# Patient Record
Sex: MALE | Race: OTHER | Employment: FULL TIME | URBAN - METROPOLITAN AREA
[De-identification: names, ages, dates, MRNs, and addresses within clinical notes are randomized per-mention and may not be internally consistent; named-entity substitution may affect disease eponyms.]

---

## 2021-08-21 ENCOUNTER — APPOINTMENT (OUTPATIENT)
Dept: CT IMAGING | Age: 21
End: 2021-08-21
Payer: COMMERCIAL

## 2021-08-21 ENCOUNTER — APPOINTMENT (OUTPATIENT)
Dept: GENERAL RADIOLOGY | Age: 21
End: 2021-08-21
Payer: COMMERCIAL

## 2021-08-21 ENCOUNTER — HOSPITAL ENCOUNTER (EMERGENCY)
Age: 21
Discharge: HOME | End: 2021-08-21
Payer: COMMERCIAL

## 2021-08-21 VITALS
OXYGEN SATURATION: 97 % | TEMPERATURE: 97.1 F | HEART RATE: 87 BPM | WEIGHT: 230 LBS | SYSTOLIC BLOOD PRESSURE: 137 MMHG | DIASTOLIC BLOOD PRESSURE: 77 MMHG | HEIGHT: 70 IN | RESPIRATION RATE: 18 BRPM | BODY MASS INDEX: 32.93 KG/M2

## 2021-08-21 DIAGNOSIS — R29.898 WRIST WEAKNESS: Primary | ICD-10-CM

## 2021-08-21 PROCEDURE — 73110 X-RAY EXAM OF WRIST: CPT

## 2021-08-21 PROCEDURE — 70450 CT HEAD/BRAIN W/O DYE: CPT

## 2021-08-21 PROCEDURE — 99283 EMERGENCY DEPT VISIT LOW MDM: CPT

## 2021-08-21 PROCEDURE — 72125 CT NECK SPINE W/O DYE: CPT

## 2021-08-21 ASSESSMENT — ENCOUNTER SYMPTOMS
VOMITING: 0
ABDOMINAL PAIN: 0
NAUSEA: 0
SHORTNESS OF BREATH: 0

## 2021-08-21 NOTE — ED PROVIDER NOTES
[address] [facility] ED  eMERGENCY dEPARTMENT eNCOUnter      Pt Name: Lexie Marrero  MRN: [id number]  [place] 2000  Date of evaluation: 8/21/2021  Provider: Ginny Rasmussen PA-C        HISTORY OF PRESENT ILLNESS    Lexie Marrero is a 23 y.o. male otherwise healthy; presenting to the ED for acute left wrist weakness that began last night. Patient states that his left wrist became weak. He states he has a bit of decreased sensation on the left thumb and small dorsal aspect of left wrist. Denies any cp, sob, HA, head injury, visual disturbances, weakness elsewhere, n/v/d, neck injury, neck pain, fever, or any other physical complaints. REVIEW OF SYSTEMS       Review of Systems   Constitutional: Negative for fatigue and fever. HENT: Negative. Respiratory: Negative for shortness of breath. Cardiovascular: Negative for chest pain. Gastrointestinal: Negative for abdominal pain, nausea and vomiting. Genitourinary: Negative. Neurological: Positive for weakness and numbness. All other systems reviewed and are negative. Except as noted above the remainder of the review of systems was reviewed and negative. PAST MEDICAL HISTORY   History reviewed. No pertinent past medical history. SURGICAL HISTORY     History reviewed. No pertinent surgical history. CURRENT MEDICATIONS       Previous Medications    No medications on file       ALLERGIES     Patient has no known allergies. FAMILY HISTORY     History reviewed. No pertinent family history.        SOCIAL HISTORY       Social History     Socioeconomic History    Marital status: Single     Spouse name: None    Number of children: None    Years of education: None    Highest education level: None   Occupational History    None   Tobacco Use    Smoking status: Current Every Day Smoker     Types: E-Cigarettes    Smokeless tobacco: Never Used   Substance and Sexual Activity    Alcohol use: None    Drug use: None    Sexual activity: None   Other Topics Concern    None   Social History Narrative    None     Social Determinants of Health     Financial Resource Strain:     Difficulty of Paying Living Expenses:    Food Insecurity:     Worried About Running Out of Food in the Last Year:     [address] in the Last Year:    Transportation Needs:     Lack of Transportation (Medical):  Lack of Transportation (Non-Medical):    Physical Activity:     Days of Exercise per Week:     Minutes of Exercise per Session:    Stress:     Feeling of Stress :    Social Connections:     Frequency of Communication with Friends and Family:     Frequency of Social Gatherings with Friends and Family:     Attends Rastafarian Services:     Active Member of Clubs or Organizations:     Attends Club or Organization Meetings:     Marital Status:    Intimate Partner Violence:     Fear of Current or Ex-Partner:     Emotionally Abused:     Physically Abused:     Sexually Abused:          PHYSICAL EXAM        ED Triage Vitals [08/21/21 1117]   BP Temp Temp Source Pulse Resp SpO2 Height Weight   137/77 97.1 °F (36.2 °C) Temporal 87 18 97 % 5' 10\" (1.778 m) 230 lb (104.3 kg)       Physical Exam  Vitals and nursing note reviewed. Constitutional:       General: He is not in acute distress. Appearance: Normal appearance. He is well-developed. He is not ill-appearing, toxic-appearing or diaphoretic. HENT:      Head: Normocephalic and atraumatic. Right Ear: Tympanic membrane, ear canal and external ear normal.      Left Ear: Tympanic membrane, ear canal and external ear normal.      Nose: Nose normal.      Mouth/Throat:      Mouth: Mucous membranes are moist.      Pharynx: No oropharyngeal exudate. Eyes:      General: No visual field deficit or scleral icterus. Right eye: No discharge. Left eye: No discharge. Conjunctiva/sclera: Conjunctivae normal.      Pupils: Pupils are equal, round, and reactive to light. Cardiovascular:      Rate and Rhythm: Normal rate and regular rhythm. Pulses: Normal pulses. Heart sounds: Normal heart sounds. No murmur heard. Pulmonary:      Effort: Pulmonary effort is normal. No respiratory distress. Breath sounds: Normal breath sounds. No wheezing or rales. Chest:      Chest wall: No tenderness. Abdominal:      General: Bowel sounds are normal. There is no distension. Palpations: Abdomen is soft. There is no mass. Tenderness: There is no abdominal tenderness. There is no guarding or rebound. Musculoskeletal:         General: No swelling, tenderness, deformity or signs of injury. Right forearm: Normal. No swelling or edema. Left forearm: Normal. No swelling or edema. Right wrist: Normal. No swelling or tenderness. Normal range of motion. Normal pulse. Left wrist: No swelling or tenderness. Decreased range of motion. Normal pulse. Right hand: Normal. No swelling. Normal strength. Left hand: No swelling. Decreased strength of finger abduction and wrist extension. Normal sensation. Normal capillary refill. Normal pulse. Cervical back: Normal range of motion and neck supple. Comments: Unable to dorsiflex   Skin:     General: Skin is warm. Capillary Refill: Capillary refill takes less than 2 seconds. Coloration: Skin is not pale. Findings: No erythema or rash. Neurological:      General: No focal deficit present. Mental Status: He is alert and oriented to person, place, and time. Cranial Nerves: No cranial nerve deficit or facial asymmetry. Sensory: Sensation is intact. No sensory deficit. Motor: Weakness present. Coordination: Coordination is intact. Coordination normal.      Gait: Gait is intact. Gait normal.      Deep Tendon Reflexes: Reflexes are normal and symmetric.  Reflexes normal.   Psychiatric:         Mood and Affect: Mood normal.         Behavior: Behavior normal. Thought Content: Thought content normal.         Judgment: Judgment normal.           LABS:  Labs Reviewed - No data to display      MDM:   Vitals:    Vitals:    08/21/21 1117   BP: 137/77   Pulse: 87   Resp: 18   Temp: 97.1 °F (36.2 °C)   TempSrc: Temporal   SpO2: 97%   Weight: 230 lb (104.3 kg)   Height: 5' 10\" (1.778 m)           PROCEDURES:  Unlessotherwise noted below, none      Procedures      FINAL IMPRESSION      1. Wrist weakness          DISPOSITION/PLAN   DISPOSITION    Nursing notes, medical records and triage notes reviewed. Vital signs reviewed. This is a 23year old male otherwise healthy; presenting to the ED for acute left wrist weakness that began last night. Case discussed with attending. Likely tendonitis. Follow up with PCP in 2 days.      The patient and/or family  -had the results of all tests and diagnosis explained to them  -Given both verbal and written discharge instructions  -Were instructed of the importance of close follow-up  -Were told that close follow-up is essential for good health and good outcomes    Bucky Suazo PA-C (electronically signed)  Emergency Physician Assistant          Bucky Suazo PA-C  08/21/21 1279

## 2025-03-10 ENCOUNTER — TELEPHONE (OUTPATIENT)
Dept: CARDIOLOGY | Facility: CLINIC | Age: 25
End: 2025-03-10

## 2025-03-10 ENCOUNTER — OFFICE VISIT (OUTPATIENT)
Dept: PRIMARY CARE | Facility: CLINIC | Age: 25
End: 2025-03-10
Payer: COMMERCIAL

## 2025-03-10 VITALS
TEMPERATURE: 98.1 F | DIASTOLIC BLOOD PRESSURE: 77 MMHG | HEART RATE: 78 BPM | WEIGHT: 268 LBS | OXYGEN SATURATION: 96 % | BODY MASS INDEX: 38.45 KG/M2 | SYSTOLIC BLOOD PRESSURE: 148 MMHG

## 2025-03-10 DIAGNOSIS — Z13.220 LIPID SCREENING: ICD-10-CM

## 2025-03-10 DIAGNOSIS — Z00.00 HEALTHCARE MAINTENANCE: ICD-10-CM

## 2025-03-10 DIAGNOSIS — I86.1 RIGHT VARICOCELE: ICD-10-CM

## 2025-03-10 PROCEDURE — 99213 OFFICE O/P EST LOW 20 MIN: CPT | Performed by: FAMILY MEDICINE

## 2025-03-10 PROCEDURE — 1036F TOBACCO NON-USER: CPT | Performed by: FAMILY MEDICINE

## 2025-03-10 ASSESSMENT — PATIENT HEALTH QUESTIONNAIRE - PHQ9
SUM OF ALL RESPONSES TO PHQ9 QUESTIONS 1 AND 2: 0
2. FEELING DOWN, DEPRESSED OR HOPELESS: NOT AT ALL
1. LITTLE INTEREST OR PLEASURE IN DOING THINGS: NOT AT ALL

## 2025-03-12 ENCOUNTER — HOSPITAL ENCOUNTER (OUTPATIENT)
Dept: RADIOLOGY | Facility: HOSPITAL | Age: 25
Discharge: HOME | End: 2025-03-12
Payer: COMMERCIAL

## 2025-03-12 DIAGNOSIS — I86.1 RIGHT VARICOCELE: ICD-10-CM

## 2025-03-12 PROCEDURE — 76870 US EXAM SCROTUM: CPT

## 2025-03-12 ASSESSMENT — ENCOUNTER SYMPTOMS
HEMATOLOGIC/LYMPHATIC NEGATIVE: 1
ENDOCRINE NEGATIVE: 1
PSYCHIATRIC NEGATIVE: 1
CARDIOVASCULAR NEGATIVE: 1
EYES NEGATIVE: 1
CONSTITUTIONAL NEGATIVE: 1
ALLERGIC/IMMUNOLOGIC NEGATIVE: 1
GASTROINTESTINAL NEGATIVE: 1
MUSCULOSKELETAL NEGATIVE: 1
RESPIRATORY NEGATIVE: 1

## 2025-03-12 NOTE — PROGRESS NOTES
Ronaldo Zayas is here with symptoms of a lump on his right testicle that he noticed about 2 to 3 days ago.  It has been very slightly painful.  He denies any trauma.  He has no UTI symptoms such as dysuria burning or frequency.  He is currently not on any medications.  He has not been seen in the office for several years.  He does have a history of elevated blood pressure.  He states that he has a family history of heart disease in his father and grandfather.  He denies smoking but he does vape..  He drinks alcohol intermittently.    Patient ID: Marquez Campos is a 24 y.o. male who presents for Mass (On testicles 1 day):    Problem List Items Addressed This Visit    None  Visit Diagnoses       Right varicocele        Relevant Orders    US scrotum    CBC    Comprehensive Metabolic Panel    Healthcare maintenance        Relevant Orders    CBC    Comprehensive Metabolic Panel    Lipid Panel    Lipid screening        Relevant Orders    CBC    Comprehensive Metabolic Panel    Lipid Panel           History reviewed. No pertinent past medical history.   History reviewed. No pertinent surgical history.   No family history on file.   Social History     Socioeconomic History    Marital status: Single     Spouse name: Not on file    Number of children: Not on file    Years of education: Not on file    Highest education level: Not on file   Occupational History    Not on file   Tobacco Use    Smoking status: Never    Smokeless tobacco: Never   Vaping Use    Vaping status: Every Day   Substance and Sexual Activity    Alcohol use: Yes    Drug use: Yes     Types: Marijuana    Sexual activity: Not on file   Other Topics Concern    Not on file   Social History Narrative    Not on file     Social Drivers of Health     Financial Resource Strain: Not on file   Food Insecurity: Not on file   Transportation Needs: Not on file   Physical Activity: Not on file   Stress: Not on file   Social Connections: Not on file   Intimate Partner  Violence: Not on file   Housing Stability: Not on file      Patient has no known allergies.   No current outpatient medications on file.     No current facility-administered medications for this visit.         There is no immunization history on file for this patient.     Review of Systems   Constitutional: Negative.    HENT: Negative.     Eyes: Negative.    Respiratory: Negative.     Cardiovascular: Negative.    Gastrointestinal: Negative.    Endocrine: Negative.    Genitourinary:  Positive for testicular pain.   Musculoskeletal: Negative.    Skin: Negative.    Allergic/Immunologic: Negative.    Hematological: Negative.    Psychiatric/Behavioral: Negative.     All other systems reviewed and are negative.       Vitals:    03/10/25 1603   BP: 148/77   Pulse: 78   Temp: 36.7 °C (98.1 °F)   SpO2: 96%     Vitals:    03/10/25 1603   Weight: 122 kg (268 lb)      Physical Exam  Constitutional:       General: He is not in acute distress.     Appearance: Normal appearance.   Cardiovascular:      Rate and Rhythm: Normal rate and regular rhythm.      Pulses: Normal pulses.      Heart sounds: Normal heart sounds. No murmur heard.     No friction rub. No gallop.   Pulmonary:      Effort: Pulmonary effort is normal. No respiratory distress.      Breath sounds: Normal breath sounds. No wheezing or rales.   Neurological:      Mental Status: He is alert.     Testes-both testicles are descended.  The right testicle is normal without masses or tenderness.  There is a very small nodular area above the right testicle.  This is only very slightly tender.  The left testicle is also normal without masses or tenderness.  There is no scrotal swelling.    ASSESSMENT/PLAN: Probable varicocele of right testicular area.  Schedule for bilateral testicular ultrasound.  Call for any worsening pain.    Elevated blood pressure.  Check CBC CMP lipid profile.  Monitor salt in diet.  Recommended that he discontinue vaping.  Exercise  regularly.  Follow-up in 3 to 4 weeks and call as needed

## 2025-03-13 DIAGNOSIS — I86.1 RIGHT VARICOCELE: Primary | ICD-10-CM

## 2025-03-13 DIAGNOSIS — N50.89 SCROTAL FULLNESS: ICD-10-CM

## 2025-03-13 LAB
ALBUMIN SERPL-MCNC: 4.7 G/DL (ref 3.6–5.1)
ALP SERPL-CCNC: 90 U/L (ref 36–130)
ALT SERPL-CCNC: 44 U/L (ref 9–46)
ANION GAP SERPL CALCULATED.4IONS-SCNC: 10 MMOL/L (CALC) (ref 7–17)
AST SERPL-CCNC: 24 U/L (ref 10–40)
BILIRUB SERPL-MCNC: 1.1 MG/DL (ref 0.2–1.2)
BUN SERPL-MCNC: 15 MG/DL (ref 7–25)
CALCIUM SERPL-MCNC: 9.9 MG/DL (ref 8.6–10.3)
CHLORIDE SERPL-SCNC: 104 MMOL/L (ref 98–110)
CHOLEST SERPL-MCNC: 153 MG/DL
CHOLEST/HDLC SERPL: 3.3 (CALC)
CO2 SERPL-SCNC: 26 MMOL/L (ref 20–32)
CREAT SERPL-MCNC: 1.02 MG/DL (ref 0.6–1.24)
EGFRCR SERPLBLD CKD-EPI 2021: 105 ML/MIN/1.73M2
ERYTHROCYTE [DISTWIDTH] IN BLOOD BY AUTOMATED COUNT: 12.5 % (ref 11–15)
GLUCOSE SERPL-MCNC: 80 MG/DL (ref 65–99)
HCT VFR BLD AUTO: 44.9 % (ref 38.5–50)
HDLC SERPL-MCNC: 46 MG/DL
HGB BLD-MCNC: 15.7 G/DL (ref 13.2–17.1)
LDLC SERPL CALC-MCNC: 92 MG/DL (CALC)
MCH RBC QN AUTO: 31.2 PG (ref 27–33)
MCHC RBC AUTO-ENTMCNC: 35 G/DL (ref 32–36)
MCV RBC AUTO: 89.3 FL (ref 80–100)
NONHDLC SERPL-MCNC: 107 MG/DL (CALC)
PLATELET # BLD AUTO: 269 THOUSAND/UL (ref 140–400)
PMV BLD REES-ECKER: 10.6 FL (ref 7.5–12.5)
POTASSIUM SERPL-SCNC: 4.1 MMOL/L (ref 3.5–5.3)
PROT SERPL-MCNC: 7 G/DL (ref 6.1–8.1)
RBC # BLD AUTO: 5.03 MILLION/UL (ref 4.2–5.8)
SODIUM SERPL-SCNC: 140 MMOL/L (ref 135–146)
TRIGL SERPL-MCNC: 61 MG/DL
WBC # BLD AUTO: 7.2 THOUSAND/UL (ref 3.8–10.8)

## 2025-03-24 ENCOUNTER — APPOINTMENT (OUTPATIENT)
Dept: PRIMARY CARE | Facility: CLINIC | Age: 25
End: 2025-03-24

## 2025-03-24 VITALS
BODY MASS INDEX: 38.54 KG/M2 | DIASTOLIC BLOOD PRESSURE: 87 MMHG | HEART RATE: 74 BPM | HEIGHT: 70 IN | TEMPERATURE: 97.9 F | SYSTOLIC BLOOD PRESSURE: 119 MMHG | WEIGHT: 269.2 LBS

## 2025-03-24 DIAGNOSIS — Z00.00 ANNUAL WELLNESS VISIT: Primary | ICD-10-CM

## 2025-03-24 DIAGNOSIS — R03.0 ELEVATED BLOOD PRESSURE READING: ICD-10-CM

## 2025-03-24 PROCEDURE — 3008F BODY MASS INDEX DOCD: CPT | Performed by: FAMILY MEDICINE

## 2025-03-24 PROCEDURE — 1036F TOBACCO NON-USER: CPT | Performed by: FAMILY MEDICINE

## 2025-03-24 PROCEDURE — 99395 PREV VISIT EST AGE 18-39: CPT | Performed by: FAMILY MEDICINE

## 2025-03-24 ASSESSMENT — ENCOUNTER SYMPTOMS
ENDOCRINE NEGATIVE: 1
MUSCULOSKELETAL NEGATIVE: 1
EYES NEGATIVE: 1
CARDIOVASCULAR NEGATIVE: 1
COUGH: 1
HEMATOLOGIC/LYMPHATIC NEGATIVE: 1
PSYCHIATRIC NEGATIVE: 1
GASTROINTESTINAL NEGATIVE: 1
CONSTITUTIONAL NEGATIVE: 1
ALLERGIC/IMMUNOLOGIC NEGATIVE: 1

## 2025-03-24 ASSESSMENT — PATIENT HEALTH QUESTIONNAIRE - PHQ9
2. FEELING DOWN, DEPRESSED OR HOPELESS: NOT AT ALL
SUM OF ALL RESPONSES TO PHQ9 QUESTIONS 1 AND 2: 0
1. LITTLE INTEREST OR PLEASURE IN DOING THINGS: NOT AT ALL

## 2025-03-24 NOTE — PROGRESS NOTES
Ronaldo Zayas is here for a wellness visit.  He states that he is overall been feeling well.  He has slight nasal congestion and cough for the last 3 to 4 days.  No fevers or chills.  He has had no testicular pain.  His testicular ultrasound was negative but inconclusive on the right epididymis.  He is scheduled for CT of abdomen and pelvis.  He otherwise has no complaints.  He denies smoking.  He does drink alcohol on occasion.    Patient ID: Marquez Campso is a 24 y.o. male who presents for Annual Exam (Physical):    Problem List Items Addressed This Visit    None     No past medical history on file.   History reviewed. No pertinent surgical history.   Family History   Problem Relation Name Age of Onset    Heart disease Father        Social History     Socioeconomic History    Marital status: Single     Spouse name: Not on file    Number of children: Not on file    Years of education: Not on file    Highest education level: Not on file   Occupational History    Not on file   Tobacco Use    Smoking status: Never    Smokeless tobacco: Never   Vaping Use    Vaping status: Every Day   Substance and Sexual Activity    Alcohol use: Yes     Comment: weekly    Drug use: Yes     Types: Marijuana    Sexual activity: Not on file   Other Topics Concern    Not on file   Social History Narrative    Not on file     Social Drivers of Health     Financial Resource Strain: Not on file   Food Insecurity: Not on file   Transportation Needs: Not on file   Physical Activity: Not on file   Stress: Not on file   Social Connections: Not on file   Intimate Partner Violence: Not on file   Housing Stability: Not on file      Patient has no known allergies.   No current outpatient medications on file.     No current facility-administered medications for this visit.       Immunization History   Administered Date(s) Administered    DTaP, Unspecified 1999, 02/03/2000, 03/24/2000, 01/04/2001, 04/23/2004    Flu vaccine (IIV4), preservative  free *Check age/dose* 09/27/2019, 09/30/2020, 11/02/2020    Flu vaccine, trivalent, preservative free, age 6 months and greater (Fluarix/Fluzone/Flulaval) 10/10/2015    Hepatitis B vaccine, 19 yrs and under (RECOMBIVAX, ENGERIX) 1999, 02/03/2000, 07/06/2000    HiB PRP-OMP conjugate vaccine, pediatric (PEDVAXHIB) 1999, 02/03/2000, 10/05/2000    HiB, unspecified 03/24/2000    Influenza, seasonal, injectable 10/04/2016, 10/04/2017    MMR vaccine, subcutaneous (MMR II) 10/05/2000, 04/23/2004    Pneumococcal Conjugate PCV 7 01/04/2001, 04/05/2001    Poliovirus vaccine, subcutaneous (IPOL) 1999, 02/03/2000, 10/05/2000, 04/23/2004    Varicella vaccine, subcutaneous (VARIVAX) 04/05/2001        Review of Systems   Constitutional: Negative.    HENT:  Positive for congestion.    Eyes: Negative.    Respiratory:  Positive for cough.    Cardiovascular: Negative.    Gastrointestinal: Negative.    Endocrine: Negative.    Genitourinary: Negative.    Musculoskeletal: Negative.    Skin: Negative.    Allergic/Immunologic: Negative.    Hematological: Negative.    Psychiatric/Behavioral: Negative.     All other systems reviewed and are negative.       Vitals:    03/24/25 1626   BP: 119/87   Pulse: 74   Temp: 36.6 °C (97.9 °F)     Vitals:    03/24/25 1626   Weight: 122 kg (269 lb 3.2 oz)      Physical Exam  Constitutional:       General: He is not in acute distress.     Appearance: Normal appearance.   HENT:      Head: Normocephalic and atraumatic.      Right Ear: There is impacted cerumen.      Left Ear: There is impacted cerumen.   Eyes:      Conjunctiva/sclera: Conjunctivae normal.      Pupils: Pupils are equal, round, and reactive to light.   Cardiovascular:      Rate and Rhythm: Normal rate and regular rhythm.      Pulses: Normal pulses.      Heart sounds: Normal heart sounds. No murmur heard.     No friction rub. No gallop.   Pulmonary:      Effort: Pulmonary effort is normal. No respiratory distress.      Breath  sounds: Normal breath sounds. No wheezing or rales.   Neurological:      General: No focal deficit present.      Mental Status: He is alert. Mental status is at baseline.   Psychiatric:         Mood and Affect: Mood normal.         Behavior: Behavior normal.         Thought Content: Thought content normal.         Judgment: Judgment normal.        Annual wellness visit.  Labs as noted are up-to-date  ASSESSMENT/PLAN: Elevated blood pressure with mild improvement.  We again discussed the importance of monitoring his salt.  Avoid smoking or vaping.  Also limit alcohol and caffeine intake.  Exercise regularly.    Mild URI.  Monitor for now and call if any worsening.    Right varicocele.  Proceed with CT as scheduled for further evaluation of right epididymis    Follow-up in 3 months and call as needed

## 2025-03-31 ENCOUNTER — ANCILLARY PROCEDURE (OUTPATIENT)
Facility: HOSPITAL | Age: 25
End: 2025-03-31
Payer: COMMERCIAL

## 2025-03-31 DIAGNOSIS — I86.1 RIGHT VARICOCELE: ICD-10-CM

## 2025-03-31 DIAGNOSIS — N50.89 SCROTAL FULLNESS: ICD-10-CM

## 2025-03-31 PROCEDURE — 2550000001 HC RX 255 CONTRASTS: Performed by: FAMILY MEDICINE

## 2025-03-31 PROCEDURE — 74177 CT ABD & PELVIS W/CONTRAST: CPT

## 2025-03-31 PROCEDURE — 74177 CT ABD & PELVIS W/CONTRAST: CPT | Performed by: RADIOLOGY

## 2025-03-31 RX ADMIN — IOHEXOL 75 ML: 350 INJECTION, SOLUTION INTRAVENOUS at 15:34

## 2025-04-02 ENCOUNTER — APPOINTMENT (OUTPATIENT)
Facility: HOSPITAL | Age: 25
End: 2025-04-02
Payer: COMMERCIAL

## 2025-04-03 ENCOUNTER — TELEPHONE (OUTPATIENT)
Dept: PRIMARY CARE | Facility: CLINIC | Age: 25
End: 2025-04-03
Payer: COMMERCIAL

## 2025-04-03 NOTE — TELEPHONE ENCOUNTER
----- Message from Anders Obando sent at 4/2/2025  3:49 PM EDT -----  Tell pt CT normal exc small bilateral inguinal hernias.  Will discuss at next OV

## 2025-05-01 ENCOUNTER — OFFICE VISIT (OUTPATIENT)
Dept: PRIMARY CARE | Facility: CLINIC | Age: 25
End: 2025-05-01
Payer: COMMERCIAL

## 2025-05-01 ENCOUNTER — APPOINTMENT (OUTPATIENT)
Dept: PRIMARY CARE | Facility: CLINIC | Age: 25
End: 2025-05-01
Payer: COMMERCIAL

## 2025-05-01 VITALS
HEIGHT: 70 IN | SYSTOLIC BLOOD PRESSURE: 136 MMHG | TEMPERATURE: 98.8 F | DIASTOLIC BLOOD PRESSURE: 90 MMHG | HEART RATE: 79 BPM | WEIGHT: 250.6 LBS | BODY MASS INDEX: 35.88 KG/M2

## 2025-05-01 DIAGNOSIS — F32.A DEPRESSION, UNSPECIFIED DEPRESSION TYPE: ICD-10-CM

## 2025-05-01 PROCEDURE — 99213 OFFICE O/P EST LOW 20 MIN: CPT | Performed by: FAMILY MEDICINE

## 2025-05-01 PROCEDURE — 1036F TOBACCO NON-USER: CPT | Performed by: FAMILY MEDICINE

## 2025-05-01 PROCEDURE — 3008F BODY MASS INDEX DOCD: CPT | Performed by: FAMILY MEDICINE

## 2025-05-01 RX ORDER — SERTRALINE HYDROCHLORIDE 50 MG/1
TABLET, FILM COATED ORAL
Qty: 90 TABLET | Refills: 1 | Status: SHIPPED | OUTPATIENT
Start: 2025-05-01

## 2025-05-01 ASSESSMENT — PATIENT HEALTH QUESTIONNAIRE - PHQ9
6. FEELING BAD ABOUT YOURSELF - OR THAT YOU ARE A FAILURE OR HAVE LET YOURSELF OR YOUR FAMILY DOWN: NEARLY EVERY DAY
10. IF YOU CHECKED OFF ANY PROBLEMS, HOW DIFFICULT HAVE THESE PROBLEMS MADE IT FOR YOU TO DO YOUR WORK, TAKE CARE OF THINGS AT HOME, OR GET ALONG WITH OTHER PEOPLE: NOT DIFFICULT AT ALL
7. TROUBLE CONCENTRATING ON THINGS, SUCH AS READING THE NEWSPAPER OR WATCHING TELEVISION: NEARLY EVERY DAY
4. FEELING TIRED OR HAVING LITTLE ENERGY: NOT AT ALL
SUM OF ALL RESPONSES TO PHQ9 QUESTIONS 1 AND 2: 5
9. THOUGHTS THAT YOU WOULD BE BETTER OFF DEAD, OR OF HURTING YOURSELF: SEVERAL DAYS
1. LITTLE INTEREST OR PLEASURE IN DOING THINGS: MORE THAN HALF THE DAYS
3. TROUBLE FALLING OR STAYING ASLEEP OR SLEEPING TOO MUCH: SEVERAL DAYS
2. FEELING DOWN, DEPRESSED OR HOPELESS: NEARLY EVERY DAY
SUM OF ALL RESPONSES TO PHQ QUESTIONS 1-9: 19
5. POOR APPETITE OR OVEREATING: NEARLY EVERY DAY
8. MOVING OR SPEAKING SO SLOWLY THAT OTHER PEOPLE COULD HAVE NOTICED. OR THE OPPOSITE, BEING SO FIGETY OR RESTLESS THAT YOU HAVE BEEN MOVING AROUND A LOT MORE THAN USUAL: NEARLY EVERY DAY

## 2025-05-03 ASSESSMENT — ENCOUNTER SYMPTOMS
CARDIOVASCULAR NEGATIVE: 1
GASTROINTESTINAL NEGATIVE: 1
AGITATION: 1
HEMATOLOGIC/LYMPHATIC NEGATIVE: 1
RESPIRATORY NEGATIVE: 1
ENDOCRINE NEGATIVE: 1
ALLERGIC/IMMUNOLOGIC NEGATIVE: 1
NERVOUS/ANXIOUS: 1
CONSTITUTIONAL NEGATIVE: 1
MUSCULOSKELETAL NEGATIVE: 1
EYES NEGATIVE: 1
DECREASED CONCENTRATION: 1

## 2025-05-03 NOTE — PROGRESS NOTES
Ronaldo Zayas is here with concerns of feeling angry and irritable with poor concentration and crying episodes.  This morning he went to work and became angry and irritable for no apparent reason.  The patient states that he does not know anything that happened at work or anything that was said that may have irritated him.  He is concerned about these recurrent episodes.  He apparently began crying this morning at work as well.  He denies any current stress in his life.  He is currently not in any relationship.  He finds himself watching various shows and becomes tearful.  He also notes poor concentration.  He feels somewhat depressed but denies any severe sadness.  He is sleeping well.  His appetite is somewhat diminished.  The patient definitely denies any thoughts of violence or hurting other people.  He also denies any suicidal ideation or planning.  There is a family history of suicide in his aunt and father also has been treated for depression.  He does not have a history of ADD in the past and overall did well during his schooling.  He drinks occasional beer but not daily.  He does occasionally smoke marijuana and vapes as well.    Patient ID: Marquez Campos is a 24 y.o. male who presents for Depression (Unable to control emotions with no reason as to why, loss of appetite):    Problem List Items Addressed This Visit    None  Visit Diagnoses         Depression, unspecified depression type        Relevant Medications    sertraline (Zoloft) 50 mg tablet    Other Relevant Orders    Referral to Psychology           Medical History[1]   Surgical History[2]   Family History[3]   Social History     Socioeconomic History    Marital status: Single     Spouse name: Not on file    Number of children: Not on file    Years of education: Not on file    Highest education level: Not on file   Occupational History    Not on file   Tobacco Use    Smoking status: Never    Smokeless tobacco: Never   Vaping Use    Vaping status:  Every Day   Substance and Sexual Activity    Alcohol use: Yes     Comment: monthly    Drug use: Yes     Types: Marijuana    Sexual activity: Not on file   Other Topics Concern    Not on file   Social History Narrative    Not on file     Social Drivers of Health     Financial Resource Strain: Not on file   Food Insecurity: Not on file   Transportation Needs: Not on file   Physical Activity: Not on file   Stress: Not on file   Social Connections: Not on file   Intimate Partner Violence: Not on file   Housing Stability: Not on file      Patient has no known allergies.   Current Medications[4]    Immunization History   Administered Date(s) Administered    DTaP, Unspecified 1999, 02/03/2000, 03/24/2000, 01/04/2001, 04/23/2004    Flu vaccine (IIV4), preservative free *Check age/dose* 09/27/2019, 09/30/2020, 11/02/2020    Flu vaccine, trivalent, preservative free, age 6 months and greater (Fluarix/Fluzone/Flulaval) 10/10/2015    Hepatitis B vaccine, 19 yrs and under (RECOMBIVAX, ENGERIX) 1999, 02/03/2000, 07/06/2000    HiB PRP-OMP conjugate vaccine, pediatric (PEDVAXHIB) 1999, 02/03/2000, 10/05/2000    HiB, unspecified 03/24/2000    Influenza, seasonal, injectable 10/04/2016, 10/04/2017    MMR vaccine, subcutaneous (MMR II) 10/05/2000, 04/23/2004    Pneumococcal Conjugate PCV 7 01/04/2001, 04/05/2001    Poliovirus vaccine, subcutaneous (IPOL) 1999, 02/03/2000, 10/05/2000, 04/23/2004    Varicella vaccine, subcutaneous (VARIVAX) 04/05/2001        Review of Systems   Constitutional: Negative.    HENT: Negative.     Eyes: Negative.    Respiratory: Negative.     Cardiovascular: Negative.    Gastrointestinal: Negative.    Endocrine: Negative.    Genitourinary: Negative.    Musculoskeletal: Negative.    Skin: Negative.    Allergic/Immunologic: Negative.    Hematological: Negative.    Psychiatric/Behavioral:  Positive for agitation and decreased concentration. The patient is nervous/anxious.    All other  systems reviewed and are negative.       Vitals:    05/01/25 1548   BP: 136/90   Pulse: 79   Temp: 37.1 °C (98.8 °F)     Vitals:    05/01/25 1548   Weight: 114 kg (250 lb 9.6 oz)      Physical Exam  Constitutional:       General: He is not in acute distress.     Appearance: Normal appearance.   Neurological:      Mental Status: He is alert.   Psychiatric:         Mood and Affect: Mood normal.         Behavior: Behavior normal.         Thought Content: Thought content normal.         Judgment: Judgment normal.          ASSESSMENT/PLAN: Anxiety with depression.  The patient and I had a long discussion regarding this.  I strongly recommended that the patient begin counseling.  He is referred to various counseling groups as well as  behavioral care.  Begin sertraline 25 mg daily for 1 week then progressing to 50 mg daily.  We discussed potential side effects.  Patient is to call or go to the ER for any thoughts of violence or suicidal thinking.  Exercise regularly  Patient is encouraged to socialize with his friends.  Discontinue vaping and marijuana use.  Limit alcohol use.  Follow-up in 2 to 3 weeks and call as needed                [1] No past medical history on file.  [2] History reviewed. No pertinent surgical history.  [3]   Family History  Problem Relation Name Age of Onset    Heart disease Father     [4]   Current Outpatient Medications   Medication Sig Dispense Refill    sertraline (Zoloft) 50 mg tablet Take one -half tablet (25 mg) by mouth daily for one week; then take one tablet (50 mg) by mouth daily. 90 tablet 1     No current facility-administered medications for this visit.

## 2025-05-05 ENCOUNTER — APPOINTMENT (OUTPATIENT)
Dept: PRIMARY CARE | Facility: CLINIC | Age: 25
End: 2025-05-05
Payer: COMMERCIAL

## 2025-05-15 ENCOUNTER — APPOINTMENT (OUTPATIENT)
Dept: PRIMARY CARE | Facility: CLINIC | Age: 25
End: 2025-05-15
Payer: COMMERCIAL

## 2025-05-15 VITALS
TEMPERATURE: 98.8 F | HEIGHT: 70 IN | DIASTOLIC BLOOD PRESSURE: 82 MMHG | BODY MASS INDEX: 35.88 KG/M2 | SYSTOLIC BLOOD PRESSURE: 130 MMHG | WEIGHT: 250.6 LBS | HEART RATE: 75 BPM

## 2025-05-15 DIAGNOSIS — F41.8 ANXIETY WITH DEPRESSION: ICD-10-CM

## 2025-05-15 DIAGNOSIS — F32.A DEPRESSION, UNSPECIFIED DEPRESSION TYPE: Primary | ICD-10-CM

## 2025-05-15 PROCEDURE — 1036F TOBACCO NON-USER: CPT | Performed by: FAMILY MEDICINE

## 2025-05-15 PROCEDURE — 99213 OFFICE O/P EST LOW 20 MIN: CPT | Performed by: FAMILY MEDICINE

## 2025-05-15 PROCEDURE — 3008F BODY MASS INDEX DOCD: CPT | Performed by: FAMILY MEDICINE

## 2025-05-15 ASSESSMENT — PATIENT HEALTH QUESTIONNAIRE - PHQ9
SUM OF ALL RESPONSES TO PHQ9 QUESTIONS 1 AND 2: 1
1. LITTLE INTEREST OR PLEASURE IN DOING THINGS: SEVERAL DAYS
10. IF YOU CHECKED OFF ANY PROBLEMS, HOW DIFFICULT HAVE THESE PROBLEMS MADE IT FOR YOU TO DO YOUR WORK, TAKE CARE OF THINGS AT HOME, OR GET ALONG WITH OTHER PEOPLE: NOT DIFFICULT AT ALL
2. FEELING DOWN, DEPRESSED OR HOPELESS: NOT AT ALL

## 2025-05-16 ASSESSMENT — ENCOUNTER SYMPTOMS
ENDOCRINE NEGATIVE: 1
HEMATOLOGIC/LYMPHATIC NEGATIVE: 1
CARDIOVASCULAR NEGATIVE: 1
MUSCULOSKELETAL NEGATIVE: 1
EYES NEGATIVE: 1
PSYCHIATRIC NEGATIVE: 1
CONSTITUTIONAL NEGATIVE: 1
RESPIRATORY NEGATIVE: 1
ALLERGIC/IMMUNOLOGIC NEGATIVE: 1
GASTROINTESTINAL NEGATIVE: 1

## 2025-05-16 NOTE — PROGRESS NOTES
Ronaldo Zayas is here for follow-up on anxiety and depression.  He has been on sertraline for about 2 weeks now.  He is tolerating 50 mg daily.  His only concern is that of an increased appetite.  He feels much better.  He is less anxious irritable and angry.  He also feels much less depressed.  He is able to function better at work and is able to concentrate and focus more effectively.  He is socializing with his friends.  He overall feels improved.    Patient ID: Marquez Campos is a 24 y.o. male who presents for Follow-up (3 week follow up, pt states for about two months he has had some forgetfulness):    Problem List Items Addressed This Visit    None     Medical History[1]   Surgical History[2]   Family History[3]   Social History     Socioeconomic History    Marital status: Single     Spouse name: Not on file    Number of children: Not on file    Years of education: Not on file    Highest education level: Not on file   Occupational History    Not on file   Tobacco Use    Smoking status: Never    Smokeless tobacco: Never   Vaping Use    Vaping status: Every Day   Substance and Sexual Activity    Alcohol use: Yes     Comment: monthly    Drug use: Yes     Types: Marijuana    Sexual activity: Not on file   Other Topics Concern    Not on file   Social History Narrative    Not on file     Social Drivers of Health     Financial Resource Strain: Not on file   Food Insecurity: Not on file   Transportation Needs: Not on file   Physical Activity: Not on file   Stress: Not on file   Social Connections: Not on file   Intimate Partner Violence: Not on file   Housing Stability: Not on file      Patient has no known allergies.   Current Medications[4]    Immunization History   Administered Date(s) Administered    DTaP, Unspecified 1999, 02/03/2000, 03/24/2000, 01/04/2001, 04/23/2004    Flu vaccine (IIV4), preservative free *Check age/dose* 09/27/2019, 09/30/2020, 11/02/2020    Flu vaccine, trivalent, preservative free,  age 6 months and greater (Fluarix/Fluzone/Flulaval) 10/10/2015    Hepatitis B vaccine, 19 yrs and under (RECOMBIVAX, ENGERIX) 1999, 02/03/2000, 07/06/2000    HiB PRP-OMP conjugate vaccine, pediatric (PEDVAXHIB) 1999, 02/03/2000, 10/05/2000    HiB, unspecified 03/24/2000    Influenza, seasonal, injectable 10/04/2016, 10/04/2017    MMR vaccine, subcutaneous (MMR II) 10/05/2000, 04/23/2004    Pneumococcal Conjugate PCV 7 01/04/2001, 04/05/2001    Poliovirus vaccine, subcutaneous (IPOL) 1999, 02/03/2000, 10/05/2000, 04/23/2004    Varicella vaccine, subcutaneous (VARIVAX) 04/05/2001        Review of Systems   Constitutional: Negative.    HENT: Negative.     Eyes: Negative.    Respiratory: Negative.     Cardiovascular: Negative.    Gastrointestinal: Negative.    Endocrine: Negative.    Genitourinary: Negative.    Musculoskeletal: Negative.    Skin: Negative.    Allergic/Immunologic: Negative.    Hematological: Negative.    Psychiatric/Behavioral: Negative.     All other systems reviewed and are negative.       Vitals:    05/15/25 1550   BP: 130/82   Pulse: 75   Temp: 37.1 °C (98.8 °F)     Vitals:    05/15/25 1550   Weight: 114 kg (250 lb 9.6 oz)      Physical Exam  Constitutional:       General: He is not in acute distress.     Appearance: Normal appearance.   Neurological:      Mental Status: He is alert.          ASSESSMENT/PLAN: Anxiety with depression improved.  Continue Zoloft 50 mg daily.  We discussed the appetite changes that can occur with SSRIs.  He is aware of the potential weight gain that may occur with these meds.  However the benefit that he has so far obtained about ways any potential adverse effects.  I encouraged him again to obtain counseling either through  behavioral health or [facility].  Exercise regularly.  Socialize regularly with friends.  Limit alcohol is much as possible.  Avoid marijuana and other drugs.  Call if any worsening    Elevated blood pressure  improved    Follow-up in 1 month and call as needed                [1] No past medical history on file.  [2] History reviewed. No pertinent surgical history.  [3]   Family History  Problem Relation Name Age of Onset    Heart disease Father     [4]   Current Outpatient Medications   Medication Sig Dispense Refill    sertraline (Zoloft) 50 mg tablet Take one -half tablet (25 mg) by mouth daily for one week; then take one tablet (50 mg) by mouth daily. (Patient taking differently: Take 1 tablet (50 mg) by mouth once daily. Take one -half tablet (25 mg) by mouth daily for one week; then take one tablet (50 mg) by mouth daily.) 90 tablet 1     No current facility-administered medications for this visit.

## 2025-06-26 ENCOUNTER — OFFICE VISIT (OUTPATIENT)
Dept: PRIMARY CARE | Facility: CLINIC | Age: 25
End: 2025-06-26
Payer: COMMERCIAL

## 2025-06-26 ENCOUNTER — APPOINTMENT (OUTPATIENT)
Dept: PRIMARY CARE | Facility: CLINIC | Age: 25
End: 2025-06-26
Payer: COMMERCIAL

## 2025-06-26 VITALS
TEMPERATURE: 98.4 F | HEART RATE: 73 BPM | HEIGHT: 70 IN | WEIGHT: 234.4 LBS | BODY MASS INDEX: 33.56 KG/M2 | SYSTOLIC BLOOD PRESSURE: 135 MMHG | DIASTOLIC BLOOD PRESSURE: 87 MMHG

## 2025-06-26 DIAGNOSIS — F41.8 ANXIETY WITH DEPRESSION: Primary | ICD-10-CM

## 2025-06-26 PROCEDURE — 3008F BODY MASS INDEX DOCD: CPT | Performed by: FAMILY MEDICINE

## 2025-06-26 PROCEDURE — G8433 SCR FOR DEP NOT CPT DOC RSN: HCPCS | Performed by: FAMILY MEDICINE

## 2025-06-26 PROCEDURE — 1036F TOBACCO NON-USER: CPT | Performed by: FAMILY MEDICINE

## 2025-06-26 PROCEDURE — 99213 OFFICE O/P EST LOW 20 MIN: CPT | Performed by: FAMILY MEDICINE

## 2025-06-26 ASSESSMENT — ENCOUNTER SYMPTOMS
MUSCULOSKELETAL NEGATIVE: 1
HEMATOLOGIC/LYMPHATIC NEGATIVE: 1
ALLERGIC/IMMUNOLOGIC NEGATIVE: 1
RESPIRATORY NEGATIVE: 1
GASTROINTESTINAL NEGATIVE: 1
PSYCHIATRIC NEGATIVE: 1
CONSTITUTIONAL NEGATIVE: 1
CARDIOVASCULAR NEGATIVE: 1
ENDOCRINE NEGATIVE: 1
EYES NEGATIVE: 1

## 2025-06-26 ASSESSMENT — PATIENT HEALTH QUESTIONNAIRE - PHQ9
SUM OF ALL RESPONSES TO PHQ9 QUESTIONS 1 AND 2: 1
2. FEELING DOWN, DEPRESSED OR HOPELESS: NOT AT ALL
1. LITTLE INTEREST OR PLEASURE IN DOING THINGS: SEVERAL DAYS

## 2025-06-26 NOTE — PROGRESS NOTES
Ronaldo Zayas is here for follow-up on anxiety and depression.  He states that he is feeling well and much better than he had previously.  He is not feeling depressed at this point.  He feels much less anxious and has much less anger at this point.  He is more relaxed around his friends and at work.  He feels less stressed at this point as well.  He continues on Zoloft 50 mg daily without side effects.  He did not begin counseling as we had previously suggested.  He is also focusing and concentrating on his job related duties better as well    Patient ID: Marquez Campos is a 24 y.o. male who presents for Follow-up (1m follow up):    Problem List Items Addressed This Visit    None     Medical History[1]   Surgical History[2]   Family History[3]   Social History     Socioeconomic History    Marital status: Single     Spouse name: Not on file    Number of children: Not on file    Years of education: Not on file    Highest education level: Not on file   Occupational History    Not on file   Tobacco Use    Smoking status: Never    Smokeless tobacco: Never   Vaping Use    Vaping status: Every Day   Substance and Sexual Activity    Alcohol use: Yes     Comment: monthly    Drug use: Yes     Types: Marijuana    Sexual activity: Not on file   Other Topics Concern    Not on file   Social History Narrative    Not on file     Social Drivers of Health     Financial Resource Strain: Not on file   Food Insecurity: Not on file   Transportation Needs: Not on file   Physical Activity: Not on file   Stress: Not on file   Social Connections: Not on file   Intimate Partner Violence: Not on file   Housing Stability: Not on file      Patient has no known allergies.   Current Medications[4]    Immunization History   Administered Date(s) Administered    DTaP, Unspecified 1999, 02/03/2000, 03/24/2000, 01/04/2001, 04/23/2004    Flu vaccine (IIV4), preservative free *Check age/dose* 09/27/2019, 09/30/2020, 11/02/2020    Flu vaccine,  trivalent, preservative free, age 6 months and greater (Fluarix/Fluzone/Flulaval) 10/10/2015    Hepatitis B vaccine, 19 yrs and under (RECOMBIVAX, ENGERIX) 1999, 02/03/2000, 07/06/2000    HiB PRP-OMP conjugate vaccine, pediatric (PEDVAXHIB) 1999, 02/03/2000, 10/05/2000    HiB, unspecified 03/24/2000    Influenza, seasonal, injectable 10/04/2016, 10/04/2017    MMR vaccine, subcutaneous (MMR II) 10/05/2000, 04/23/2004    Pneumococcal Conjugate PCV 7 01/04/2001, 04/05/2001    Poliovirus vaccine, subcutaneous (IPOL) 1999, 02/03/2000, 10/05/2000, 04/23/2004    Varicella vaccine, subcutaneous (VARIVAX) 04/05/2001        Review of Systems   Constitutional: Negative.    HENT: Negative.     Eyes: Negative.    Respiratory: Negative.     Cardiovascular: Negative.    Gastrointestinal: Negative.    Endocrine: Negative.    Genitourinary: Negative.    Musculoskeletal: Negative.    Skin: Negative.    Allergic/Immunologic: Negative.    Hematological: Negative.    Psychiatric/Behavioral: Negative.     All other systems reviewed and are negative.       Vitals:    06/26/25 1547   BP: 135/87   Pulse: 73   Temp: 36.9 °C (98.4 °F)     Vitals:    06/26/25 1547   Weight: 106 kg (234 lb 6.4 oz)      Physical Exam  Constitutional:       General: He is not in acute distress.     Appearance: Normal appearance.   Neurological:      Mental Status: He is alert.   Psychiatric:         Mood and Affect: Mood normal.         Behavior: Behavior normal.         Thought Content: Thought content normal.         Judgment: Judgment normal.          ASSESSMENT/PLAN: Anxiety with depression stable and improved.  Continue sertraline 50 mg daily.  Exercise regularly.  Patient is encouraged to socialize with friends and to stay active.  At this point because of his improvement we will not seek any counseling at the present time.  I did recommend that he reconsider the counseling if his symptoms worsen.  He has decreased significantly the amount  of alcohol consumption.    Borderline blood pressure.  Will continue to monitor this closely    Follow-up in 3 to 4 months and call as needed            [1] No past medical history on file.  [2] History reviewed. No pertinent surgical history.  [3]   Family History  Problem Relation Name Age of Onset    Heart disease Father     [4]   Current Outpatient Medications   Medication Sig Dispense Refill    sertraline (Zoloft) 50 mg tablet Take one -half tablet (25 mg) by mouth daily for one week; then take one tablet (50 mg) by mouth daily. (Patient taking differently: Take 1 tablet (50 mg) by mouth once daily.) 90 tablet 1     No current facility-administered medications for this visit.